# Patient Record
Sex: FEMALE | Race: WHITE
[De-identification: names, ages, dates, MRNs, and addresses within clinical notes are randomized per-mention and may not be internally consistent; named-entity substitution may affect disease eponyms.]

---

## 2017-01-10 ENCOUNTER — HOSPITAL ENCOUNTER (OUTPATIENT)
Dept: HOSPITAL 80 - FIMAGING | Age: 47
End: 2017-01-10
Attending: FAMILY MEDICINE
Payer: COMMERCIAL

## 2017-01-10 DIAGNOSIS — N63: Primary | ICD-10-CM

## 2017-01-10 NOTE — US
Left Breast Ultrasound

 

History: Follow up probably benign nodular focus in the subareolar left breast in a patient who has u
harry a benign ultrasound left breast biopsy January 2016.

 

Technique:  Longitudinal and transverse images were obtained utilizing a 15 MHz transducer. Color Dop
pler evaluation is employed for assessment of vascularity. Comparison to the most recent targeted lef
t breast ultrasound performed July 6, 2016.

 

Findings: Again demonstrated is a tubular focal area beneath the left nipple. No internal flow is dem
onstrated on color Doppler. The region measures 7.1 x 7.3 x 1.4 cm and is probably unchanged with pre
vious size estimated at 8.5 x 6.0 x 1.8 cm.

 

Impression: Probably benign findings, BI-RADS 2.

 

Recommendation: Continued clinical follow up and as long as physical examination remains negative, ma
mmographic screening as well as repeat targeted ultrasound suggested July 2017.

 

A verbal report was given to the patient.

[facility] will send a result letter to the patient.

## 2017-07-21 ENCOUNTER — HOSPITAL ENCOUNTER (OUTPATIENT)
Dept: HOSPITAL 80 - FIMAGING | Age: 47
End: 2017-07-21
Attending: FAMILY MEDICINE
Payer: COMMERCIAL

## 2017-07-21 DIAGNOSIS — Z12.31: Primary | ICD-10-CM

## 2017-07-21 DIAGNOSIS — R92.8: ICD-10-CM

## 2017-07-21 PROCEDURE — G0202 SCR MAMMO BI INCL CAD: HCPCS

## 2019-01-14 ENCOUNTER — HOSPITAL ENCOUNTER (OUTPATIENT)
Dept: HOSPITAL 80 - FIMAGING | Age: 49
End: 2019-01-14
Attending: FAMILY MEDICINE
Payer: COMMERCIAL

## 2019-01-14 DIAGNOSIS — Z12.31: Primary | ICD-10-CM
